# Patient Record
Sex: FEMALE | Race: BLACK OR AFRICAN AMERICAN | Employment: FULL TIME | URBAN - METROPOLITAN AREA
[De-identification: names, ages, dates, MRNs, and addresses within clinical notes are randomized per-mention and may not be internally consistent; named-entity substitution may affect disease eponyms.]

---

## 2019-11-18 ENCOUNTER — OP HISTORICAL/CONVERTED ENCOUNTER (OUTPATIENT)
Dept: OTHER | Age: 59
End: 2019-11-18

## 2019-12-01 ENCOUNTER — OP HISTORICAL/CONVERTED ENCOUNTER (OUTPATIENT)
Dept: OTHER | Age: 59
End: 2019-12-01

## 2021-03-31 ENCOUNTER — APPOINTMENT (OUTPATIENT)
Dept: GENERAL RADIOLOGY | Age: 61
End: 2021-03-31
Attending: EMERGENCY MEDICINE
Payer: COMMERCIAL

## 2021-03-31 ENCOUNTER — HOSPITAL ENCOUNTER (EMERGENCY)
Age: 61
Discharge: HOME | End: 2021-03-31
Attending: EMERGENCY MEDICINE
Payer: COMMERCIAL

## 2021-03-31 VITALS
HEART RATE: 73 BPM | WEIGHT: 160 LBS | RESPIRATION RATE: 18 BRPM | SYSTOLIC BLOOD PRESSURE: 167 MMHG | OXYGEN SATURATION: 99 % | BODY MASS INDEX: 25.71 KG/M2 | DIASTOLIC BLOOD PRESSURE: 85 MMHG | TEMPERATURE: 98 F | HEIGHT: 66 IN

## 2021-03-31 DIAGNOSIS — R42 DIZZY SPELLS: Primary | ICD-10-CM

## 2021-03-31 DIAGNOSIS — E87.6 HYPOKALEMIA: ICD-10-CM

## 2021-03-31 LAB
ANION GAP SERPL CALC-SCNC: 9 MMOL/L (ref 5–15)
APPEARANCE UR: CLEAR
BACTERIA URNS QL MICRO: NEGATIVE /HPF
BASOPHILS # BLD: 0 K/UL (ref 0–0.1)
BASOPHILS NFR BLD: 0 % (ref 0–1)
BILIRUB UR QL: NEGATIVE
BUN SERPL-MCNC: 13 MG/DL (ref 6–20)
BUN/CREAT SERPL: 13 (ref 12–20)
CA-I BLD-MCNC: 10.2 MG/DL (ref 8.5–10.1)
CHLORIDE SERPL-SCNC: 107 MMOL/L (ref 97–108)
CO2 SERPL-SCNC: 24 MMOL/L (ref 21–32)
COLOR UR: ABNORMAL
CREAT SERPL-MCNC: 0.98 MG/DL (ref 0.55–1.02)
DIFFERENTIAL METHOD BLD: ABNORMAL
EOSINOPHIL # BLD: 0.1 K/UL (ref 0–0.4)
EOSINOPHIL NFR BLD: 1 % (ref 0–7)
ERYTHROCYTE [DISTWIDTH] IN BLOOD BY AUTOMATED COUNT: 15.4 % (ref 11.5–14.5)
GLUCOSE BLD STRIP.AUTO-MCNC: 136 MG/DL (ref 65–100)
GLUCOSE SERPL-MCNC: 119 MG/DL (ref 65–100)
GLUCOSE UR STRIP.AUTO-MCNC: NEGATIVE MG/DL
HCT VFR BLD AUTO: 37.7 % (ref 35–47)
HGB BLD-MCNC: 12 G/DL (ref 11.5–16)
HGB UR QL STRIP: NEGATIVE
IMM GRANULOCYTES # BLD AUTO: 0 K/UL (ref 0–0.04)
IMM GRANULOCYTES NFR BLD AUTO: 0 % (ref 0–0.5)
KETONES UR QL STRIP.AUTO: NEGATIVE MG/DL
LEUKOCYTE ESTERASE UR QL STRIP.AUTO: NEGATIVE
LYMPHOCYTES # BLD: 2.3 K/UL (ref 0.8–3.5)
LYMPHOCYTES NFR BLD: 31 % (ref 12–49)
MCH RBC QN AUTO: 25.9 PG (ref 26–34)
MCHC RBC AUTO-ENTMCNC: 31.8 G/DL (ref 30–36.5)
MCV RBC AUTO: 81.4 FL (ref 80–99)
MONOCYTES # BLD: 0.4 K/UL (ref 0–1)
MONOCYTES NFR BLD: 5 % (ref 5–13)
NEUTS SEG # BLD: 4.7 K/UL (ref 1.8–8)
NEUTS SEG NFR BLD: 63 % (ref 32–75)
NITRITE UR QL STRIP.AUTO: NEGATIVE
PERFORMED BY, TECHID: ABNORMAL
PH UR STRIP: 8 [PH] (ref 5–8)
PLATELET # BLD AUTO: 366 K/UL (ref 150–400)
PMV BLD AUTO: 11.1 FL (ref 8.9–12.9)
POTASSIUM SERPL-SCNC: 3.1 MMOL/L (ref 3.5–5.1)
PROT UR STRIP-MCNC: NEGATIVE MG/DL
RBC # BLD AUTO: 4.63 M/UL (ref 3.8–5.2)
RBC #/AREA URNS HPF: ABNORMAL /HPF (ref 0–5)
SODIUM SERPL-SCNC: 140 MMOL/L (ref 136–145)
SP GR UR REFRACTOMETRY: 1 (ref 1–1.03)
TROPONIN I SERPL-MCNC: <0.05 NG/ML
TROPONIN I SERPL-MCNC: <0.05 NG/ML
UA: UC IF INDICATED,UAUC: ABNORMAL
UROBILINOGEN UR QL STRIP.AUTO: 0.1 EU/DL (ref 0.1–1)
WBC # BLD AUTO: 7.6 K/UL (ref 3.6–11)
WBC URNS QL MICRO: ABNORMAL /HPF (ref 0–4)

## 2021-03-31 PROCEDURE — 36415 COLL VENOUS BLD VENIPUNCTURE: CPT

## 2021-03-31 PROCEDURE — 71045 X-RAY EXAM CHEST 1 VIEW: CPT

## 2021-03-31 PROCEDURE — 74011250636 HC RX REV CODE- 250/636: Performed by: EMERGENCY MEDICINE

## 2021-03-31 PROCEDURE — 93005 ELECTROCARDIOGRAM TRACING: CPT

## 2021-03-31 PROCEDURE — 74011000250 HC RX REV CODE- 250: Performed by: EMERGENCY MEDICINE

## 2021-03-31 PROCEDURE — 84484 ASSAY OF TROPONIN QUANT: CPT

## 2021-03-31 PROCEDURE — 81001 URINALYSIS AUTO W/SCOPE: CPT

## 2021-03-31 PROCEDURE — 82962 GLUCOSE BLOOD TEST: CPT

## 2021-03-31 PROCEDURE — 80048 BASIC METABOLIC PNL TOTAL CA: CPT

## 2021-03-31 PROCEDURE — 87086 URINE CULTURE/COLONY COUNT: CPT

## 2021-03-31 PROCEDURE — 85025 COMPLETE CBC W/AUTO DIFF WBC: CPT

## 2021-03-31 PROCEDURE — 99285 EMERGENCY DEPT VISIT HI MDM: CPT

## 2021-03-31 PROCEDURE — 74011250637 HC RX REV CODE- 250/637: Performed by: EMERGENCY MEDICINE

## 2021-03-31 RX ORDER — IBUPROFEN 600 MG/1
600 TABLET ORAL
Status: COMPLETED | OUTPATIENT
Start: 2021-03-31 | End: 2021-03-31

## 2021-03-31 RX ORDER — LIDOCAINE HYDROCHLORIDE 20 MG/ML
15 SOLUTION OROPHARYNGEAL ONCE
Status: COMPLETED | OUTPATIENT
Start: 2021-03-31 | End: 2021-03-31

## 2021-03-31 RX ORDER — POTASSIUM CHLORIDE 750 MG/1
40 TABLET, FILM COATED, EXTENDED RELEASE ORAL
Status: COMPLETED | OUTPATIENT
Start: 2021-03-31 | End: 2021-03-31

## 2021-03-31 RX ORDER — MAG HYDROX/ALUMINUM HYD/SIMETH 200-200-20
30 SUSPENSION, ORAL (FINAL DOSE FORM) ORAL ONCE
Status: COMPLETED | OUTPATIENT
Start: 2021-03-31 | End: 2021-03-31

## 2021-03-31 RX ADMIN — POTASSIUM CHLORIDE 40 MEQ: 750 TABLET, FILM COATED, EXTENDED RELEASE ORAL at 15:11

## 2021-03-31 RX ADMIN — ALUMINUM HYDROXIDE, MAGNESIUM HYDROXIDE, AND SIMETHICONE 30 ML: 200; 200; 20 SUSPENSION ORAL at 15:11

## 2021-03-31 RX ADMIN — SODIUM CHLORIDE 1000 ML: 9 INJECTION, SOLUTION INTRAVENOUS at 15:11

## 2021-03-31 RX ADMIN — LIDOCAINE HYDROCHLORIDE 15 ML: 20 SOLUTION ORAL; TOPICAL at 15:11

## 2021-03-31 RX ADMIN — IBUPROFEN 600 MG: 600 TABLET ORAL at 16:21

## 2021-03-31 NOTE — ED NOTES
Pt alert and oriented x4. gsc 15. Discharge instructions given and reviewed with pt by writer. Pt verbalized understanding. Pt ambulated out of ED with steady gait. No concerns voiced. No signs of acute distress noted.

## 2021-03-31 NOTE — ED PROVIDER NOTES
EMERGENCY DEPARTMENT HISTORY AND PHYSICAL EXAM      Date: 3/31/2021  Patient Name: Mitchell Payton    History of Presenting Illness     Chief Complaint   Patient presents with    Dizziness       History Provided By: Patient    HPI: Mitchell Payton, 63 y.o. female with a past medical history significant diabetes and hypertension presents to the ED with chief complaint of Dizziness  . 56-year-old female has been feeling dizzy. No room spinning just feeling lightheaded. Does have a vague chest pressure described as a lump. No nausea vomiting diarrhea. No black or bloody stool. No cough congestion. Symptoms started today. There are no other complaints, changes, or physical findings at this time. PCP: Eliud Connell MD    Current Facility-Administered Medications   Medication Dose Route Frequency Provider Last Rate Last Admin    sodium chloride 0.9 % bolus infusion 1,000 mL  1,000 mL IntraVENous Doron Khoury MD 1,000 mL/hr at 03/31/21 1511 1,000 mL at 03/31/21 1511       Past History     Past Medical History:  Past Medical History:   Diagnosis Date    DM (diabetes mellitus) ([facility] [address]     HTN (hypertension)        Past Surgical History:  History reviewed. No pertinent surgical history. Family History:  History reviewed. No pertinent family history. Social History:  Social History     Tobacco Use    Smoking status: Never Smoker    Smokeless tobacco: Never Used   Substance Use Topics    Alcohol use: Never     Frequency: Never    Drug use: Never       Allergies:  No Known Allergies      Review of Systems   Review of Systems   Constitutional: Negative. Negative for chills, fatigue and fever. HENT: Negative. Negative for congestion, nosebleeds and sore throat. Eyes: Negative. Negative for pain, discharge and visual disturbance. Respiratory: Positive for chest tightness. Negative for cough and shortness of breath.     Cardiovascular: Negative for chest pain, palpitations and leg swelling. Gastrointestinal: Negative for abdominal pain, blood in stool, constipation, diarrhea, nausea and vomiting. Endocrine: Negative. Genitourinary: Negative. Negative for difficulty urinating, dysuria, pelvic pain and vaginal bleeding. Musculoskeletal: Negative. Negative for arthralgias, back pain and myalgias. Skin: Negative. Negative for rash and wound. Allergic/Immunologic: Negative. Neurological: Positive for dizziness. Negative for syncope, weakness, numbness and headaches. Hematological: Negative. Psychiatric/Behavioral: Negative. Negative for agitation, confusion and suicidal ideas. All other systems reviewed and are negative. Physical Exam   Physical Exam  Vitals signs and nursing note reviewed. Exam conducted with a chaperone present. Constitutional:       Appearance: Normal appearance. She is normal weight. HENT:      Head: Normocephalic and atraumatic. Nose: Nose normal.      Mouth/Throat:      Mouth: Mucous membranes are moist.      Pharynx: Oropharynx is clear. Eyes:      Extraocular Movements: Extraocular movements intact. Conjunctiva/sclera: Conjunctivae normal.      Pupils: Pupils are equal, round, and reactive to light. Neck:      Musculoskeletal: Normal range of motion and neck supple. Cardiovascular:      Rate and Rhythm: Normal rate and regular rhythm. Pulses: Normal pulses. Heart sounds: Normal heart sounds. Pulmonary:      Effort: Pulmonary effort is normal. No respiratory distress. Breath sounds: Normal breath sounds. Abdominal:      General: Abdomen is flat. Bowel sounds are normal. There is no distension. Palpations: Abdomen is soft. Tenderness: There is no abdominal tenderness. There is no guarding. Musculoskeletal: Normal range of motion. General: No swelling, tenderness, deformity or signs of injury. Right lower leg: No edema. Left lower leg: No edema.    Skin:     General: Skin is warm and dry. Capillary Refill: Capillary refill takes less than 2 seconds. Findings: No lesion or rash. Neurological:      General: No focal deficit present. Mental Status: She is alert and oriented to person, place, and time. Mental status is at baseline. Cranial Nerves: No cranial nerve deficit. Psychiatric:         Mood and Affect: Mood normal.         Behavior: Behavior normal.         Thought Content: Thought content normal.         Judgment: Judgment normal.         Diagnostic Study Results     Labs -     Recent Results (from the past 12 hour(s))   GLUCOSE, POC    Collection Time: 03/31/21 12:12 PM   Result Value Ref Range    Glucose (POC) 136 (H) 65 - 100 mg/dL    Performed by Bina Gutierrez    CBC WITH AUTOMATED DIFF    Collection Time: 03/31/21  1:12 PM   Result Value Ref Range    WBC 7.6 3.6 - 11.0 K/uL    RBC 4.63 3.80 - 5.20 M/uL    HGB 12.0 11.5 - 16.0 g/dL    HCT 37.7 35.0 - 47.0 %    MCV 81.4 80.0 - 99.0 FL    MCH 25.9 (L) 26.0 - 34.0 PG    MCHC 31.8 30.0 - 36.5 g/dL    RDW 15.4 (H) 11.5 - 14.5 %    PLATELET 710 672 - 062 K/uL    MPV 11.1 8.9 - 12.9 FL    NEUTROPHILS 63 32 - 75 %    LYMPHOCYTES 31 12 - 49 %    MONOCYTES 5 5 - 13 %    EOSINOPHILS 1 0 - 7 %    BASOPHILS 0 0 - 1 %    IMMATURE GRANULOCYTES 0 0.0 - 0.5 %    ABS. NEUTROPHILS 4.7 1.8 - 8.0 K/UL    ABS. LYMPHOCYTES 2.3 0.8 - 3.5 K/UL    ABS. MONOCYTES 0.4 0.0 - 1.0 K/UL    ABS. EOSINOPHILS 0.1 0.0 - 0.4 K/UL    ABS. BASOPHILS 0.0 0.0 - 0.1 K/UL    ABS. IMM.  GRANS. 0.0 0.00 - 0.04 K/UL    DF AUTOMATED     METABOLIC PANEL, BASIC    Collection Time: 03/31/21  1:12 PM   Result Value Ref Range    Sodium 140 136 - 145 mmol/L    Potassium 3.1 (L) 3.5 - 5.1 mmol/L    Chloride 107 97 - 108 mmol/L    CO2 24 21 - 32 mmol/L    Anion gap 9 5 - 15 mmol/L    Glucose 119 (H) 65 - 100 mg/dL    BUN 13 6 - 20 mg/dL    Creatinine 0.98 0.55 - 1.02 mg/dL    BUN/Creatinine ratio 13 12 - 20      GFR est AA >60 >60 ml/min/1.73m2    GFR est non-AA 58 (L) >60 ml/min/1.73m2    Calcium 10.2 (H) 8.5 - 10.1 mg/dL   TROPONIN I    Collection Time: 03/31/21  1:12 PM   Result Value Ref Range    Troponin-I, Qt. <0.05 <0.05 ng/mL       Radiologic Studies -   XR CHEST PORT   Final Result        CT Results  (Last 48 hours)    None        CXR Results  (Last 48 hours)               03/31/21 1240  XR CHEST PORT Final result    Narrative:  Chest single view. A single view of the chest is obtained. Lung volumes are within normal limits. The peripheral lungs are clear. Cardiac and mediastinal structures are within   normal limits. There is no pneumothorax or pleural effusion. Medical Decision Making and ED Course   I am the first provider for this patient. I reviewed the vital signs, available nursing notes, past medical history, past surgical history, family history and social history. Vital Signs-Reviewed the patient's vital signs. Patient Vitals for the past 12 hrs:   Temp Pulse Resp BP SpO2   03/31/21 1210 98 °F (36.7 °C) 72 18 (!) 153/84 100 %       EKG interpretation:   EKG at 1209. Normal sinus rhythm rate of 80. No ST changes. No T wave inversions. Normal intervals. Reason rule out dysrhythmia. Interpreted by ER physician. Records Reviewed: Previous Hospital chart. EMS run report      ED Course:   Initial assessment performed. The patients presenting problems have been discussed, and they are in agreement with the care plan formulated and outlined with them. I have encouraged them to ask questions as they arise throughout their visit.     Orders Placed This Encounter    XR CHEST PORT     Standing Status:   Standing     Number of Occurrences:   1     Order Specific Question:   Reason for Exam     Answer:   dizziness    CBC WITH AUTOMATED DIFF     Standing Status:   Standing     Number of Occurrences:   1    METABOLIC PANEL, BASIC     Standing Status:   Standing     Number of Occurrences:   1    TROPONIN I Standing Status:   Standing     Number of Occurrences:   1    URINALYSIS W/ REFLEX CULTURE     Standing Status:   Standing     Number of Occurrences:   1    TROPONIN I     Standing Status:   Standing     Number of Occurrences:   1    GLUCOSE, POC     Standing Status:   Standing     Number of Occurrences:   1    EKG 12 LEAD INITIAL     Standing Status:   Standing     Number of Occurrences:   1     Order Specific Question:   Reason for Exam:     Answer:   dizziness    alum-mag hydroxide-simeth (MYLANTA) oral suspension 30 mL    lidocaine (XYLOCAINE) 2 % viscous solution 15 mL    sodium chloride 0.9 % bolus infusion 1,000 mL    potassium chloride SR (KLOR-CON 10) tablet 40 mEq              CONSULTANTS:  Consults      Provider Notes (Medical Decision Making):   48-year-old female feeling lightheaded dizzy. Improved after fluids here. Plan for 2 troponins given patient did have a chest pressure pain. Patient's vitals however are stable. No evidence of acute GI bleed versus sepsis. Procedures                       Disposition       Emergency Department Disposition:        Diagnosis     Clinical Impression:   1. Dizzy spells    2. Hypokalemia        Attestations:    Eulogio Lira MD    Please note that this dictation was completed with [email or web address], the [employer] voice recognition software. Quite often unanticipated grammatical, syntax, homophones, and other interpretive errors are inadvertently transcribed by the computer software. Please disregard these errors. Please excuse any errors that have escaped final proofreading. Thank you.

## 2021-04-01 LAB
ATRIAL RATE: 80 BPM
CALCULATED P AXIS, ECG09: 13 DEGREES
CALCULATED R AXIS, ECG10: 35 DEGREES
CALCULATED T AXIS, ECG11: 32 DEGREES
DIAGNOSIS, 93000: NORMAL
P-R INTERVAL, ECG05: 134 MS
Q-T INTERVAL, ECG07: 376 MS
QRS DURATION, ECG06: 78 MS
QTC CALCULATION (BEZET), ECG08: 433 MS
VENTRICULAR RATE, ECG03: 80 BPM

## 2021-04-02 LAB
BACTERIA SPEC CULT: NORMAL
SPECIAL REQUESTS,SREQ: NORMAL